# Patient Record
Sex: MALE | Race: WHITE | NOT HISPANIC OR LATINO | URBAN - METROPOLITAN AREA
[De-identification: names, ages, dates, MRNs, and addresses within clinical notes are randomized per-mention and may not be internally consistent; named-entity substitution may affect disease eponyms.]

---

## 2017-07-03 ENCOUNTER — OFFICE VISIT - HEALTHEAST (OUTPATIENT)
Dept: FAMILY MEDICINE | Facility: CLINIC | Age: 57
End: 2017-07-03

## 2017-07-03 DIAGNOSIS — J40 BRONCHITIS: ICD-10-CM

## 2018-03-12 ENCOUNTER — COMMUNICATION - HEALTHEAST (OUTPATIENT)
Dept: FAMILY MEDICINE | Facility: CLINIC | Age: 58
End: 2018-03-12

## 2020-06-08 ENCOUNTER — OFFICE VISIT - HEALTHEAST (OUTPATIENT)
Dept: FAMILY MEDICINE | Facility: CLINIC | Age: 60
End: 2020-06-08

## 2020-06-08 DIAGNOSIS — Z13.9 SCREENING FOR CONDITION: ICD-10-CM

## 2020-06-08 DIAGNOSIS — L03.113 CELLULITIS OF RIGHT UPPER EXTREMITY: ICD-10-CM

## 2020-06-08 LAB
BASOPHILS # BLD AUTO: 0.1 THOU/UL (ref 0–0.2)
BASOPHILS NFR BLD AUTO: 1 % (ref 0–2)
EOSINOPHIL # BLD AUTO: 0.2 THOU/UL (ref 0–0.4)
EOSINOPHIL NFR BLD AUTO: 2 % (ref 0–6)
ERYTHROCYTE [DISTWIDTH] IN BLOOD BY AUTOMATED COUNT: 10.9 % (ref 11–14.5)
HCT VFR BLD AUTO: 43.2 % (ref 40–54)
HGB BLD-MCNC: 14.7 G/DL (ref 14–18)
LYMPHOCYTES # BLD AUTO: 1.7 THOU/UL (ref 0.8–4.4)
LYMPHOCYTES NFR BLD AUTO: 17 % (ref 20–40)
MCH RBC QN AUTO: 32.3 PG (ref 27–34)
MCHC RBC AUTO-ENTMCNC: 34.1 G/DL (ref 32–36)
MCV RBC AUTO: 95 FL (ref 80–100)
MONOCYTES # BLD AUTO: 0.9 THOU/UL (ref 0–0.9)
MONOCYTES NFR BLD AUTO: 9 % (ref 2–10)
NEUTROPHILS # BLD AUTO: 7.2 THOU/UL (ref 2–7.7)
NEUTROPHILS NFR BLD AUTO: 72 % (ref 50–70)
PLATELET # BLD AUTO: 238 THOU/UL (ref 140–440)
PMV BLD AUTO: 6.8 FL (ref 7–10)
RBC # BLD AUTO: 4.56 MILL/UL (ref 4.4–6.2)
WBC: 10 THOU/UL (ref 4–11)

## 2020-06-09 LAB — B BURGDOR IGG+IGM SER QL: 0.06 INDEX VALUE

## 2020-06-10 ENCOUNTER — OFFICE VISIT - HEALTHEAST (OUTPATIENT)
Dept: FAMILY MEDICINE | Facility: CLINIC | Age: 60
End: 2020-06-10

## 2020-06-10 DIAGNOSIS — A69.20 ERYTHEMA MIGRANS (LYME DISEASE): ICD-10-CM

## 2021-04-16 ENCOUNTER — IMMUNIZATION (OUTPATIENT)
Dept: NURSING | Facility: CLINIC | Age: 61
End: 2021-04-16
Payer: COMMERCIAL

## 2021-04-16 PROCEDURE — 0001A PR COVID VAC PFIZER DIL RECON 30 MCG/0.3 ML IM: CPT

## 2021-04-16 PROCEDURE — 91300 PR COVID VAC PFIZER DIL RECON 30 MCG/0.3 ML IM: CPT

## 2021-05-02 ENCOUNTER — HEALTH MAINTENANCE LETTER (OUTPATIENT)
Age: 61
End: 2021-05-02

## 2021-05-07 ENCOUNTER — IMMUNIZATION (OUTPATIENT)
Dept: NURSING | Facility: CLINIC | Age: 61
End: 2021-05-07
Attending: INTERNAL MEDICINE
Payer: COMMERCIAL

## 2021-05-07 PROCEDURE — 91300 PR COVID VAC PFIZER DIL RECON 30 MCG/0.3 ML IM: CPT

## 2021-05-07 PROCEDURE — 0002A PR COVID VAC PFIZER DIL RECON 30 MCG/0.3 ML IM: CPT

## 2021-05-28 ENCOUNTER — RECORDS - HEALTHEAST (OUTPATIENT)
Dept: ADMINISTRATIVE | Facility: CLINIC | Age: 61
End: 2021-05-28

## 2021-05-31 VITALS — WEIGHT: 139 LBS

## 2021-06-04 VITALS
RESPIRATION RATE: 14 BRPM | WEIGHT: 138 LBS | OXYGEN SATURATION: 96 % | SYSTOLIC BLOOD PRESSURE: 97 MMHG | TEMPERATURE: 98.7 F | DIASTOLIC BLOOD PRESSURE: 65 MMHG | HEART RATE: 101 BPM

## 2021-06-04 VITALS
OXYGEN SATURATION: 98 % | RESPIRATION RATE: 18 BRPM | TEMPERATURE: 98.3 F | HEART RATE: 75 BPM | SYSTOLIC BLOOD PRESSURE: 104 MMHG | WEIGHT: 139.6 LBS | DIASTOLIC BLOOD PRESSURE: 68 MMHG

## 2021-06-11 NOTE — PROGRESS NOTES
Chief Complaint   Patient presents with     Cough     x 1 month. Nasal drainage and runny nose. Productive cough       HPI    Patient is here for a month of productive cough with mostly clear sputum, occasional colored, and nasal discharge. Intermittent subjective fevers.No chest pain, shortness of breath. He is a smoker.     ROS: Pertinent ROS noted in HPI.     No Known Allergies    Patient Active Problem List   Diagnosis     Obstructive Sleep Apnea     Atrial Fibrillation       No family history on file.    Social History     Social History     Marital status:      Spouse name: N/A     Number of children: N/A     Years of education: N/A     Occupational History     Not on file.     Social History Main Topics     Smoking status: Current Every Day Smoker     Smokeless tobacco: Not on file     Alcohol use Not on file     Drug use: Not on file     Sexual activity: Not on file     Other Topics Concern     Not on file     Social History Narrative     No narrative on file         Objective:    Vitals:    07/03/17 1358   BP: 114/60   Pulse: 97   Resp: 16   Temp: 99.1  F (37.3  C)   SpO2: 96%       Gen:NAD  CV: RRR, no M, R, G  Pulm: CTAB, normal effort      Bronchitis  -     azithromycin (ZITHROMAX Z-TERESSA) 250 MG tablet; Take 2 tablets (500 mg) on  Day 1,  followed by 1 tablet (250 mg) once daily on Days 2 through 5.  -     benzonatate (TESSALON PERLES) 100 MG capsule; Take 1 capsule (100 mg total) by mouth every 6 (six) hours as needed for cough.

## 2021-06-18 NOTE — PATIENT INSTRUCTIONS - HE
Patient Instructions by Franco Youssef PA-C at 6/8/2020  1:30 PM     Author: Franco Youssef PA-C Service: -- Author Type: Physician Assistant    Filed: 6/8/2020  2:16 PM Encounter Date: 6/8/2020 Status: Addendum    : Franco Youssef PA-C (Physician Assistant)    Related Notes: Original Note by Franco Youssef PA-C (Physician Assistant) filed at 6/8/2020  2:15 PM       Apply topical heat 3 times a day to the area.  Take precautions avoid damage the skin and do not fall asleep with a heating pad.  Take the antibiotic as written.  May use probiotic.  Monitor the outline of the redness so it does not spread or get larger.  Follow-up with your primary care provider for reevaluation treatment if not getting good resolution or if new symptoms or concerns present.  Especially if you are not getting better over the next 48 hours.  You should not be getting any worse in that timeframe.  Your blood has been sent off for a screening test for Lyme disease.  Will be contacted with that result when made available.      Discharge Instructions for Cellulitis  You have been diagnosed with cellulitis. This is an infection in the deepest layer of the skin. In some cases, the infection also affects the muscle. Cellulitis is caused by bacteria. The bacteria can enter the body through broken skin. This can happen with a cut, scratch, animal bite, or an insect bite that has been scratched. You may have been treated in the hospital with antibiotics and fluids. You will likely be given a prescription for antibiotics to take at home. This sheet will help you take care of yourself at home.  Home Care  When you are home:    Take the prescribed antibiotic medicine you are given as directed until it is gone. Take it even if you feel better. It treats the infection and stops it from returning. Not taking all the medicine can make future infections hard to treat.    Keep the infected area clean.    When possible, raise the infected area above the  level of your heart. This helps keep swelling down.    Talk with your healthcare provider if you are in pain. Ask what kind of over-the-counter medicine you can take for pain.    Apply clean bandages as advised.    Take your temperature once a day for a week.    Wash your hands often to prevent spreading the infection.  In the future, wash your hands before and after you touch cuts, scratches, or bandages. This will help prevent infection.   When to return for re-evaluation  Return immediately or be seen in the emergency room if you have any of the following:    Difficulty or pain when moving the joints above or below the infected area    Develop discharge or pus draining from the area    Fever of 100.4 F (38 C) or higher, or as directed by your healthcare provider    Pain that gets worse in or around the infected     Redness that gets worse in or around the infected area, particularly if the area of redness expands to a wider area    Shaking chills    Swelling of the infected area    Vomiting   Date Last Reviewed: 8/1/2016 2000-2016 [employer]. [address] [place] [address]. All rights reserved. This information is not intended as a substitute for professional medical care. Always follow your healthcare professional's instructions.          Patient Education     Lyme Disease  Lyme disease is caused by bacteria. The infection is most often passed during the bite of a deer tick. The tick is very small, so many people with Lyme disease do not know they have been bitten. Tests for Lyme disease are not always accurate early in the disease. If the disease is suspected, treatment may begin before testing confirms the infection. A long course of antibiotics is the standard treatment.  If untreated, Lyme disease can worsen and full-body symptoms can develop          Early local symptoms may appear within a few days to a month after the tick bite. These symptoms may include a round, red rash  that looks like a bull's-eye target with darker outer ring and a darker center. There may fever, chills, fatigue, body aches, and headache. In time, the rash goes away, even without treatment. That doesn't mean the infection has gone away, however. In some cases, early local symptoms never develop.    Early disseminated symptoms may appear weeks to months after the bite. These can include muscle aches, fatigue, fever, headache, stiff neck, and joint pain and swelling.    Late-stage symptoms include weakness in an arm, leg or one side of the face, headache, fever, and numbness and tingling in the arms or legs, confusion, and memory loss.  Testing is done for the presence of the bacteria. When the infection is treated early, it can be cured. In some cases, a second or third course of antibiotics may be needed. Be sure to follow your healthcare providers directions about treatment.  Home care  If oral antibiotics have been prescribed, take them exactly as directed until they are completely gone. Do not stop taking them until you have taken the full course or your healthcare provider has told you to stop.  Ask your healthcare provider about taking over-the-counter medicines to control symptoms such as aches and fever.  Follow-up care  Follow up with your healthcare provider as advised. Be sure to return for follow-up testing as directed to be sure the infection has been treated.  When to seek medical advice  Call your healthcare provider right away if any of the following occur:    Current symptoms get worse    Unexplained fever, neck pain or stiffness, or headache    Arm, leg or facial weakness    Joint pain or swelling    Numbness and tingling in the arms or legs    Confusion or memory loss    Irregular or rapid heartbeat  Date Last Reviewed: 9/25/2015 2000-2017 [employer]. [address] [place] [address]. All rights reserved. This information is not intended as a substitute for  professional medical care. Always follow your healthcare professional's instructions.

## 2021-06-18 NOTE — PATIENT INSTRUCTIONS - HE
Patient Instructions by Teddy Saul PA-C at 6/10/2020  9:00 AM     Author: Teddy Saul PA-C Service: -- Author Type: Physician Assistant    Filed: 6/10/2020  9:36 AM Encounter Date: 6/10/2020 Status: Signed    : Teddy Saul PA-C (Physician Assistant)       You were seen today for likely lyme disease infection. If we obtained labs, we will contact you to discuss the results and appropriate follow-up with your primary care provider.  Symptom management:   - Take antibiotics for full course, even if symptoms completely improve  - May use acetaminophen or ibuprofen for any body aches, fever, or discomfort  Lyme Disease  Lyme disease is caused by bacteria. The infection is most often passed during the bite of a deer tick. The tick is very small, so many people with Lyme disease do not know they have been bitten. Tests for Lyme disease are not always accurate early in the disease. If the disease is suspected, treatment may begin before testing confirms the infection. A long course of antibiotics is the standard treatment.  If untreated, Lyme disease can worsen and full-body symptoms can develop    Early local symptoms may appear within a few days to a month after the tick bite. These symptoms may include a round, red rash that looks like a bull's-eye target with darker outer ring and a darker center. There may fever, chills, fatigue, body aches, and headache. In time, the rash goes away, even without treatment. That doesn't mean the infection has gone away, however. In some cases, early local symptoms never develop.    Early disseminated symptoms may appear weeks to months after the bite. These can include muscle aches, fatigue, fever, headache, stiff neck, and joint pain and swelling.    Late-stage symptoms include weakness in an arm, leg or one side of the face, headache, fever, and numbness and tingling in the arms or legs, confusion, and memory loss.  Testing is done for the presence of  the bacteria. When the infection is treated early, it can be cured. In some cases, a second or third course of antibiotics may be needed. Be sure to follow your healthcare providers directions about treatment.  Home care  If oral antibiotics have been prescribed, take them exactly as directed until they are completely gone. Do not stop taking them until you have taken the full course or your healthcare provider has told you to stop.  Ask your healthcare provider about taking over-the-counter medicines to control symptoms such as aches and fever.  When to be seen for re-evaluation  Call your healthcare provider right away if any of the following occur:    Current symptoms get worse    Unexplained fever, neck pain or stiffness, or headache    Arm, leg or facial weakness    Joint pain or swelling    Numbness and tingling in the arms or legs    Confusion or memory loss    Irregular or rapid heartbeat  Date Last Reviewed: 9/25/2015 2000-2017 [employer]. [address] [place] [address], [place] [address]. All rights reserved. This information is not intended as a substitute for professional medical care. Always follow your healthcare professional's instructions.

## 2021-06-29 NOTE — PROGRESS NOTES
Progress Notes by Franco Youssef PA-C at 6/8/2020  1:30 PM     Author: Franco Youssef PA-C Service: -- Author Type: Physician Assistant    Filed: 6/8/2020  4:34 PM Encounter Date: 6/8/2020 Status: Addendum    : Franco Youssef PA-C (Physician Assistant)    Related Notes: Original Note by Franco Youssef PA-C (Physician Assistant) filed at 6/8/2020  3:57 PM       Subjective:      Patient ID: Rah Calderon is a 60 y.o. male.    Chief Complaint:    HPI     Rah Calderon is a 60 y.o. male who presents today complaining of a pea sized swelling in the right axilla and also a red small area of induration with a possible bug bite and redness around the right upper extremity.  Patient has not had any fever chills night sweats fatigue or weight loss.  Dates that the symptoms have happened over the last 2 days.  The area on the right upper extremity is red hot swollen and tender.  He does not remember a bug bite spider bite or other insect bite.  No noted break in the skin or drainage.  Patient is also not had any stage I Lyme disease symptoms to report.  He has not tried treatment for this at home.      No past medical history on file.    No past surgical history on file.    No family history on file.    Social History     Tobacco Use   ? Smoking status: Current Every Day Smoker   ? Smokeless tobacco: Never Used   Substance Use Topics   ? Alcohol use: Not on file   ? Drug use: Not on file       Review of Systems  As above in HPI, otherwise balance of Review of Systems are negative.    Objective:     BP 97/65 (Patient Site: Left Arm, Patient Position: Sitting, Cuff Size: Adult Regular)   Pulse (!) 101   Temp 98.7  F (37.1  C) (Oral)   Resp 14   Wt 138 lb (62.6 kg)   SpO2 96%     Physical Exam  General: Patient is resting comfortably no acute distress is afebrile  HEENT: Head is normocephalic atraumatic   eyes are PERRL EOMI sclera anicteric   Skin: Without rash non-diaphoretic  Musculoskeletal: Examination of the right  upper extremity shows that there is a large 13 x 15 cm area of erythema.  This was outlined with a marking pen.  In the central area there is a similar 5 mm area of red induration there is no necrosis breakdown or drainage.  At this time he does not have any noted lymphangitic streaking but there is a small palpable lymph node in the right axilla.     Lab:  Recent Results (from the past 24 hour(s))   HM1 (CBC with Diff)   Result Value Ref Range    WBC 10.0 4.0 - 11.0 thou/uL    RBC 4.56 4.40 - 6.20 mill/uL    Hemoglobin 14.7 14.0 - 18.0 g/dL    Hematocrit 43.2 40.0 - 54.0 %    MCV 95 80 - 100 fL    MCH 32.3 27.0 - 34.0 pg    MCHC 34.1 32.0 - 36.0 g/dL    RDW 10.9 (L) 11.0 - 14.5 %    Platelets 238 140 - 440 thou/uL    MPV 6.8 (L) 7.0 - 10.0 fL    Neutrophils % 72 (H) 50 - 70 %    Lymphocytes % 17 (L) 20 - 40 %    Monocytes % 9 2 - 10 %    Eosinophils % 2 0 - 6 %    Basophils % 1 0 - 2 %    Neutrophils Absolute 7.2 2.0 - 7.7 thou/uL    Lymphocytes Absolute 1.7 0.8 - 4.4 thou/uL    Monocytes Absolute 0.9 0.0 - 0.9 thou/uL    Eosinophils Absolute 0.2 0.0 - 0.4 thou/uL    Basophils Absolute 0.1 0.0 - 0.2 thou/uL       Lyme disease screening is pending.    Assessment:     Procedures    The primary encounter diagnosis was Cellulitis of right upper extremity. A diagnosis of Screening for condition was also pertinent to this visit.    Plan:     1. Cellulitis of right upper extremity  cephalexin (KEFLEX) 500 MG capsule    HM1(CBC and Differential)    Lyme Antibody Cascade    HM1 (CBC with Diff)   2. Screening for condition         Etiologies to include a bug bite reaction and a cellulitis were considered.  He will be treated for the cellulitis.  He may use over-the-counter Zyrtec to help with itching and a reaction to insect bite.  Had a conversation with the patient stating that we will have him treated for cellulitis.  In addition we will do a screening Lyme disease test.  If he does have returned positive test he will be  treated.  Otherwise will be treated for cellulitis in the interim.  He had no history of MRSA and no other indication of induration and necrosis in the central part of the lesion.  Indication for return was gone over.  He is told not to get any worse in the next 48 hours.  If he has any change he will follow-up in the urgent care or take the treatment till conclusion and ensure that he has good follow-up.    Patient Instructions     Apply topical heat 3 times a day to the area.  Take precautions avoid damage the skin and do not fall asleep with a heating pad.  Take the antibiotic as written.  May use probiotic.  Monitor the outline of the redness so it does not spread or get larger.  Follow-up with your primary care provider for reevaluation treatment if not getting good resolution or if new symptoms or concerns present.  Especially if you are not getting better over the next 48 hours.  You should not be getting any worse in that timeframe.  Your blood has been sent off for a screening test for Lyme disease.  Will be contacted with that result when made available.      Discharge Instructions for Cellulitis  You have been diagnosed with cellulitis. This is an infection in the deepest layer of the skin. In some cases, the infection also affects the muscle. Cellulitis is caused by bacteria. The bacteria can enter the body through broken skin. This can happen with a cut, scratch, animal bite, or an insect bite that has been scratched. You may have been treated in the hospital with antibiotics and fluids. You will likely be given a prescription for antibiotics to take at home. This sheet will help you take care of yourself at home.  Home Care  When you are home:    Take the prescribed antibiotic medicine you are given as directed until it is gone. Take it even if you feel better. It treats the infection and stops it from returning. Not taking all the medicine can make future infections hard to treat.    Keep the infected  area clean.    When possible, raise the infected area above the level of your heart. This helps keep swelling down.    Talk with your healthcare provider if you are in pain. Ask what kind of over-the-counter medicine you can take for pain.    Apply clean bandages as advised.    Take your temperature once a day for a week.    Wash your hands often to prevent spreading the infection.  In the future, wash your hands before and after you touch cuts, scratches, or bandages. This will help prevent infection.   When to return for re-evaluation  Return immediately or be seen in the emergency room if you have any of the following:    Difficulty or pain when moving the joints above or below the infected area    Develop discharge or pus draining from the area    Fever of 100.4 F (38 C) or higher, or as directed by your healthcare provider    Pain that gets worse in or around the infected     Redness that gets worse in or around the infected area, particularly if the area of redness expands to a wider area    Shaking chills    Swelling of the infected area    Vomiting   Date Last Reviewed: 8/1/2016 2000-2016 [employer]. [address] [place] [address]. All rights reserved. This information is not intended as a substitute for professional medical care. Always follow your healthcare professional's instructions.          Patient Education     Lyme Disease  Lyme disease is caused by bacteria. The infection is most often passed during the bite of a deer tick. The tick is very small, so many people with Lyme disease do not know they have been bitten. Tests for Lyme disease are not always accurate early in the disease. If the disease is suspected, treatment may begin before testing confirms the infection. A long course of antibiotics is the standard treatment.  If untreated, Lyme disease can worsen and full-body symptoms can develop          Early local symptoms may appear within a few days to a month after  the tick bite. These symptoms may include a round, red rash that looks like a bull's-eye target with darker outer ring and a darker center. There may fever, chills, fatigue, body aches, and headache. In time, the rash goes away, even without treatment. That doesn't mean the infection has gone away, however. In some cases, early local symptoms never develop.    Early disseminated symptoms may appear weeks to months after the bite. These can include muscle aches, fatigue, fever, headache, stiff neck, and joint pain and swelling.    Late-stage symptoms include weakness in an arm, leg or one side of the face, headache, fever, and numbness and tingling in the arms or legs, confusion, and memory loss.  Testing is done for the presence of the bacteria. When the infection is treated early, it can be cured. In some cases, a second or third course of antibiotics may be needed. Be sure to follow your healthcare providers directions about treatment.  Home care  If oral antibiotics have been prescribed, take them exactly as directed until they are completely gone. Do not stop taking them until you have taken the full course or your healthcare provider has told you to stop.  Ask your healthcare provider about taking over-the-counter medicines to control symptoms such as aches and fever.  Follow-up care  Follow up with your healthcare provider as advised. Be sure to return for follow-up testing as directed to be sure the infection has been treated.  When to seek medical advice  Call your healthcare provider right away if any of the following occur:    Current symptoms get worse    Unexplained fever, neck pain or stiffness, or headache    Arm, leg or facial weakness    Joint pain or swelling    Numbness and tingling in the arms or legs    Confusion or memory loss    Irregular or rapid heartbeat  Date Last Reviewed: 9/25/2015 2000-2017 [employer]. [address] [place] [address], [place] [address]. All rights reserved. This  information is not intended as a substitute for professional medical care. Always follow your healthcare professional's instructions.

## 2021-06-29 NOTE — PROGRESS NOTES
Progress Notes by Teddy Saul PA-C at 6/10/2020  9:00 AM     Author: Teddy Saul PA-C Service: -- Author Type: Physician Assistant    Filed: 6/10/2020 12:13 PM Encounter Date: 6/10/2020 Status: Signed    : Teddy Saul PA-C (Physician Assistant)         Assessment & Plan:       1. Erythema migrans (Lyme disease)  doxycycline (VIBRA-TABS) 100 MG tablet      Medical Decision Making  Patient presents with worsening rash on the right upper extremity following a previous diagnosis of cellulitis 2 days ago.  He has not had any improvement while on the oral Keflex.  Rash today is notable for signs consistent with erythema migrans given central erythema, clearing, and an outer ring of erythema.  Rash further does not appear to be due to a bacterial cellulitis as the tissue is not indurated and there appears to be a central bite site.  Will have patient discontinue Keflex and will instead start on 3 weeks of oral doxycycline.  Discussed side effects of doxycycline including avoiding sun exposure.  Also recommended patient discontinuing using warm compresses and instead using cold compresses for itchiness.  Discussed signs of worsening symptoms and when to follow-up with PCP if no symptom improvement.     Patient Instructions   You were seen today for likely lyme disease infection. If we obtained labs, we will contact you to discuss the results and appropriate follow-up with your primary care provider.  Symptom management:   - Take antibiotics for full course, even if symptoms completely improve  - May use acetaminophen or ibuprofen for any body aches, fever, or discomfort  Lyme Disease  Lyme disease is caused by bacteria. The infection is most often passed during the bite of a deer tick. The tick is very small, so many people with Lyme disease do not know they have been bitten. Tests for Lyme disease are not always accurate early in the disease. If the disease is suspected, treatment may begin  before testing confirms the infection. A long course of antibiotics is the standard treatment.  If untreated, Lyme disease can worsen and full-body symptoms can develop    Early local symptoms may appear within a few days to a month after the tick bite. These symptoms may include a round, red rash that looks like a bull's-eye target with darker outer ring and a darker center. There may fever, chills, fatigue, body aches, and headache. In time, the rash goes away, even without treatment. That doesn't mean the infection has gone away, however. In some cases, early local symptoms never develop.    Early disseminated symptoms may appear weeks to months after the bite. These can include muscle aches, fatigue, fever, headache, stiff neck, and joint pain and swelling.    Late-stage symptoms include weakness in an arm, leg or one side of the face, headache, fever, and numbness and tingling in the arms or legs, confusion, and memory loss.  Testing is done for the presence of the bacteria. When the infection is treated early, it can be cured. In some cases, a second or third course of antibiotics may be needed. Be sure to follow your healthcare providers directions about treatment.  Home care  If oral antibiotics have been prescribed, take them exactly as directed until they are completely gone. Do not stop taking them until you have taken the full course or your healthcare provider has told you to stop.  Ask your healthcare provider about taking over-the-counter medicines to control symptoms such as aches and fever.  When to be seen for re-evaluation  Call your healthcare provider right away if any of the following occur:    Current symptoms get worse    Unexplained fever, neck pain or stiffness, or headache    Arm, leg or facial weakness    Joint pain or swelling    Numbness and tingling in the arms or legs    Confusion or memory loss    Irregular or rapid heartbeat  Date Last Reviewed: 9/25/2015 2000-2017 [employer]. [address] [place] [id number]. All rights reserved. This information is not intended as a substitute for professional medical care. Always follow your healthcare professional's instructions.                Subjective:       Rah Calderon is a 60 y.o. male here for evaluation of ongoing rash involving the right upper extremity.  Patient was seen 2 days ago in the walk-in care clinic and was diagnosed with suspected cellulitis possibly secondary to an insect bite.  Patient did not note any history of a tick bite, and Lyme's testing was negative at that time.  He further denies noticing any insect stings or trauma to the region.  However, patient returns today as the rash has continued to spread outside of the marked region.  Associated symptoms include itchiness.  Patient also notes worsening fatigue and chills, but no active fevers.  He states he normally has mild body aches at baseline particularly in the lower back, and he has not noted any significant change.  Patient has been taking oral Keflex and applying warm compresses with no improvement.  Patient notes he does frequently work outside in the yard daily.    The following portions of the patient's history were reviewed and updated as appropriate: allergies, current medications and problem list.    Review of Systems  Pertinent items are noted in HPI.     Allergies  No Known Allergies    No family history on file.    Social History     Socioeconomic History   ? Marital status:      Spouse name: None   ? Number of children: None   ? Years of education: None   ? Highest education level: None   Occupational History   ? None   Social Needs   ? Financial resource strain: None   ? Food insecurity     Worry: None     Inability: None   ? Transportation needs     Medical: None     Non-medical: None   Tobacco Use   ? Smoking status: Current Every Day Smoker   ? Smokeless tobacco: Never Used   ? Tobacco comment: no vaping   Substance and  Sexual Activity   ? Alcohol use: None   ? Drug use: None   ? Sexual activity: None   Lifestyle   ? Physical activity     Days per week: None     Minutes per session: None   ? Stress: None   Relationships   ? Social connections     Talks on phone: None     Gets together: None     Attends Confucianist service: None     Active member of club or organization: None     Attends meetings of clubs or organizations: None     Relationship status: None   ? Intimate partner violence     Fear of current or ex partner: None     Emotionally abused: None     Physically abused: None     Forced sexual activity: None   Other Topics Concern   ? None   Social History Narrative   ? None         Objective:       /68 (Patient Site: Left Arm, Patient Position: Sitting, Cuff Size: Adult Regular)   Pulse 75   Temp 98.3  F (36.8  C) (Oral)   Resp 18   Wt 139 lb 9.6 oz (63.3 kg)   SpO2 98%   General appearance: alert, appears stated age, cooperative, no distress and non-toxic  Skin: Right upper arm: A large circular rash with the appearance of a target lesion and a central bite site; there is increased warmth to touch, no induration; skin is otherwise intact, no active drainage

## 2021-09-08 ENCOUNTER — OFFICE VISIT (OUTPATIENT)
Dept: FAMILY MEDICINE | Facility: CLINIC | Age: 61
End: 2021-09-08
Payer: COMMERCIAL

## 2021-09-08 VITALS
OXYGEN SATURATION: 95 % | RESPIRATION RATE: 16 BRPM | HEART RATE: 150 BPM | SYSTOLIC BLOOD PRESSURE: 114 MMHG | DIASTOLIC BLOOD PRESSURE: 79 MMHG | TEMPERATURE: 98.6 F

## 2021-09-08 DIAGNOSIS — J06.9 VIRAL UPPER RESPIRATORY TRACT INFECTION WITH COUGH: Primary | ICD-10-CM

## 2021-09-08 PROCEDURE — U0005 INFEC AGEN DETEC AMPLI PROBE: HCPCS | Performed by: PHYSICIAN ASSISTANT

## 2021-09-08 PROCEDURE — U0003 INFECTIOUS AGENT DETECTION BY NUCLEIC ACID (DNA OR RNA); SEVERE ACUTE RESPIRATORY SYNDROME CORONAVIRUS 2 (SARS-COV-2) (CORONAVIRUS DISEASE [COVID-19]), AMPLIFIED PROBE TECHNIQUE, MAKING USE OF HIGH THROUGHPUT TECHNOLOGIES AS DESCRIBED BY CMS-2020-01-R: HCPCS | Performed by: PHYSICIAN ASSISTANT

## 2021-09-08 PROCEDURE — 99214 OFFICE O/P EST MOD 30 MIN: CPT | Performed by: PHYSICIAN ASSISTANT

## 2021-09-08 NOTE — PROGRESS NOTES
URGENT CARE VISIT:    SUBJECTIVE:   Rah Calderon is a 61 year old male presenting with a chief complaint of fever, stuffy nose, shortness of breath, sore throat, body aches and loss of appetite.  Onset was 3 day(s) ago.   He denies the following symptoms: vomiting and diarrhea  Course of illness is same.    Treatment measures tried include None tried with no relief of symptoms.  Predisposing factors include tobacco use.  He is fully vaccinated for COVID.    PMH: History reviewed. No pertinent past medical history.  Allergies: Patient has no known allergies.   Medications:   No current outpatient medications on file.     Social History:   Social History     Tobacco Use     Smoking status: Current Every Day Smoker     Smokeless tobacco: Never Used     Tobacco comment: no vaping   Substance Use Topics     Alcohol use: Not on file       ROS:  Review of systems negative except as stated above.    OBJECTIVE:  /79 (BP Location: Right arm, Patient Position: Sitting, Cuff Size: Adult Regular)   Pulse (!) 150   Temp 98.6  F (37  C) (Oral)   Resp 16   SpO2 95%   GENERAL APPEARANCE: healthy, alert and no distress  EYES: EOMI,  PERRL, conjunctiva clear  HENT: ear canals and TM's normal.  Nose and mouth without ulcers, erythema or lesions  NECK: supple, nontender, no lymphadenopathy  RESP: lungs clear to auscultation - no rales, rhonchi or wheezes  CV: tachycardic, no murmur noted  SKIN: no suspicious lesions or rashes    Labs:    Results for orders placed or performed in visit on 09/08/21   XR Chest 2 Views     Status: None    Narrative    EXAM DATE:         09/08/2021    EXAM: X-RAY CHEST, 2 VIEWS, FRONTAL AND LATERAL  LOCATION: [facility]  DATE/TIME: 9/8/2021 7:30 PM    INDICATION: SHORT OF BREATH, CHEST PAIN  COMPARISON: None.    IMPRESSION: Pulmonary hyperinflation. No acute findings. The lungs are clear and there are no pleural effusions. Normal heart size.                  ASSESSMENT:    ICD-10-CM    1. Viral upper respiratory tract infection with cough  J06.9 XR Chest 2 Views     XR Chest 2 Views     Symptomatic COVID-19 Virus (Coronavirus) by PCR Nasopharyngeal     Symptomatic COVID-19 Virus (Coronavirus) by PCR Nose       PLAN:  30 minutes spent during clinical visit  Patient Instructions   Patient was educated on the natural course of viral condition. Chest x-ray was negative for pneumonia. COVID PCR is pending. Self isolate until further notice. Declined albuterol inhaler. Conservative measures discussed including rest, OTC analgesics, increased fluids, humidifier, and over-the-counter cough suppressants. See your primary care provider if symptoms do not improve in 7 days. Seek emergency care if you develop shortness of breath or fever over 103.    Patient verbalized understanding and is agreeable to plan. The patient was discharged ambulatory and in stable condition.    Rimma Peres PA-C ....................  9/8/2021   8:11 PM

## 2021-09-09 LAB — SARS-COV-2 RNA RESP QL NAA+PROBE: NEGATIVE

## 2021-09-09 NOTE — PATIENT INSTRUCTIONS
Patient was educated on the natural course of viral condition. Chest x-ray was negative for pneumonia. COVID PCR is pending. Self isolate until further notice. Declined albuterol inhaler. Conservative measures discussed including rest, OTC analgesics, increased fluids, humidifier, and over-the-counter cough suppressants. See your primary care provider if symptoms do not improve in 7 days. Seek emergency care if you develop shortness of breath or fever over 103.

## 2021-09-14 ENCOUNTER — OFFICE VISIT (OUTPATIENT)
Dept: FAMILY MEDICINE | Facility: CLINIC | Age: 61
End: 2021-09-14
Payer: COMMERCIAL

## 2021-09-14 VITALS
DIASTOLIC BLOOD PRESSURE: 74 MMHG | TEMPERATURE: 98.5 F | HEART RATE: 93 BPM | OXYGEN SATURATION: 93 % | WEIGHT: 135.7 LBS | SYSTOLIC BLOOD PRESSURE: 107 MMHG

## 2021-09-14 DIAGNOSIS — J06.9 VIRAL UPPER RESPIRATORY TRACT INFECTION WITH COUGH: Primary | ICD-10-CM

## 2021-09-14 PROCEDURE — 99213 OFFICE O/P EST LOW 20 MIN: CPT | Performed by: PHYSICIAN ASSISTANT

## 2021-09-14 PROCEDURE — U0005 INFEC AGEN DETEC AMPLI PROBE: HCPCS | Performed by: PHYSICIAN ASSISTANT

## 2021-09-14 PROCEDURE — U0003 INFECTIOUS AGENT DETECTION BY NUCLEIC ACID (DNA OR RNA); SEVERE ACUTE RESPIRATORY SYNDROME CORONAVIRUS 2 (SARS-COV-2) (CORONAVIRUS DISEASE [COVID-19]), AMPLIFIED PROBE TECHNIQUE, MAKING USE OF HIGH THROUGHPUT TECHNOLOGIES AS DESCRIBED BY CMS-2020-01-R: HCPCS | Performed by: PHYSICIAN ASSISTANT

## 2021-09-14 NOTE — PATIENT INSTRUCTIONS
Patient was educated on the natural course of viral illness which typically lasts up to 10 days.  He was seen last week for same symptoms. Symptoms are much improved however, work requires a second COVID test. First COVID test was negative and second test is pending. Conservative measures discussed including increased fluids, warm steamy shower, and analgesics (Tylenol and/or Ibuprofen). See your primary care provider if symptoms worsen or do not improve in 5 days. Seek emergency care if you develop fever over 104 or shortness of breath.

## 2021-09-14 NOTE — PROGRESS NOTES
URGENT CARE VISIT:    SUBJECTIVE:   Rah Calderon is a 61 year old male presenting with a chief complaint of stuffy nose, cough - non-productive and fatigue.  Onset was 1 week(s) ago.   He denies the following symptoms: shortness of breath, sore throat, vomiting and diarrhea  Course of illness is improving.    Treatment measures tried include Tylenol/Ibuprofen with some relief of symptoms.  Predisposing factors include None.  Negative COVID test last week.     PMH: History reviewed. No pertinent past medical history.  Allergies: Patient has no known allergies.   Medications:   No current outpatient medications on file.     Social History:   Social History     Tobacco Use     Smoking status: Current Every Day Smoker     Smokeless tobacco: Never Used     Tobacco comment: no vaping   Substance Use Topics     Alcohol use: Not on file       ROS:  Review of systems negative except as stated above.    OBJECTIVE:  /74 (BP Location: Right arm, Patient Position: Sitting, Cuff Size: Adult Regular)   Pulse 93   Temp 98.5  F (36.9  C) (Oral)   Wt 61.6 kg (135 lb 11.2 oz)   SpO2 93%   GENERAL APPEARANCE: healthy, alert and no distress  EYES: EOMI,  PERRL, conjunctiva clear  HENT: ear canals and TM's normal.  Nose and mouth without ulcers, erythema or lesions  NECK: supple, nontender, no lymphadenopathy  RESP: lungs clear to auscultation - no rales, rhonchi or wheezes  CV: regular rates and rhythm, normal S1 S2, no murmur noted  SKIN: no suspicious lesions or rashes    ASSESSMENT:    ICD-10-CM    1. Viral upper respiratory tract infection with cough  J06.9 Symptomatic COVID-19 Virus (Coronavirus) by PCR Nose       PLAN:  Patient Instructions   Patient was educated on the natural course of viral illness which typically lasts up to 10 days.  He was seen last week for same symptoms. Symptoms are much improved however, work requires a second COVID test. First COVID test was negative and second test is pending. Conservative  measures discussed including increased fluids, warm steamy shower, and analgesics (Tylenol and/or Ibuprofen). See your primary care provider if symptoms worsen or do not improve in 5 days. Seek emergency care if you develop fever over 104 or shortness of breath.     Patient verbalized understanding and is agreeable to plan. The patient was discharged ambulatory and in stable condition.    Rimma Peres PA-C ....................  9/14/2021   12:17 PM

## 2021-09-15 LAB — SARS-COV-2 RNA RESP QL NAA+PROBE: NEGATIVE

## 2021-10-16 ENCOUNTER — HEALTH MAINTENANCE LETTER (OUTPATIENT)
Age: 61
End: 2021-10-16

## 2022-05-28 ENCOUNTER — HEALTH MAINTENANCE LETTER (OUTPATIENT)
Age: 62
End: 2022-05-28

## 2022-10-01 ENCOUNTER — HEALTH MAINTENANCE LETTER (OUTPATIENT)
Age: 62
End: 2022-10-01

## 2023-06-04 ENCOUNTER — HEALTH MAINTENANCE LETTER (OUTPATIENT)
Age: 63
End: 2023-06-04

## 2024-03-03 ENCOUNTER — HEALTH MAINTENANCE LETTER (OUTPATIENT)
Age: 64
End: 2024-03-03